# Patient Record
(demographics unavailable — no encounter records)

---

## 2025-02-24 NOTE — HISTORY OF PRESENT ILLNESS
[de-identified] : Patient is a 12-year-old male accompanied with mother here for evaluation of right little finger injury.  Patient states earlier today he was playing basketball patient states that he tripped and fell and landed on his right hand with immediate pain of the right little finger.  Patient was brought to urgent care by mother x-rays were taken and patient was told he had a little finger fracture.  Patient was placed in splint and told to follow-up with orthopedics.  Right little finger exam: Noted swelling of proximal phalanx, PIP, middle phalanx with surrounding ecchymosis, no injury to nailbed no deformity.  Tenderness palpation to proximal phalanx, middle phalanx and PIPJ, good range of motion with stiffness to PIPJ, no gross instability with varus/valgus stress no gross instability neurovascular intact good capillary refill  X-ray right little finger: Displaced dorsal avulsion fracture off of the base of the middle phalanx  X-ray right little finger postreduction: Improved alignment of dorsal avulsion fracture of the base of the middle phalanx  Discussed with patient and mother detail that patient has a displaced middle phalanx base fracture.  Discussed in detail that sometimes these fractures do require surgical intervention but can be treated conservatively.  Plan today is for reduction and splinting.  Using sterile method with patient and mother's permission 3 cc 1% lidocaine used for digital block fifth digit.  Fracture was reduced and patient was sent up for repeat x-rays after posterior finger splint.  X-rays show mild improved alignment.  Patient is to follow-up in 1 week with hand department/pediatric orthopedics for repeat x-rays with splint on of right little finger.  Do not get splint wet, use cast cover no gym/sports no high-impact activities.

## 2025-03-12 NOTE — ASSESSMENT
[FreeTextEntry1] : Placed in ulnar brace today to be worn at all times follow up in 3 weeks for new x-rays

## 2025-03-12 NOTE — PHYSICAL EXAM
[Not Examined] : not examined [Normal] : The patient is moving all extremities spontaneously without any gross neurologic deficits. They walk with a fluid nonantalgic gait. There are equal and symmetric deep tendon reflexes in the upper and lower extremities bilaterally. There is gross intact sensation to soft and light touch in the bilateral upper and lower extremities [de-identified] :  ISLT Intact Motor

## 2025-03-12 NOTE — PHYSICAL EXAM
[Not Examined] : not examined [Normal] : The patient is moving all extremities spontaneously without any gross neurologic deficits. They walk with a fluid nonantalgic gait. There are equal and symmetric deep tendon reflexes in the upper and lower extremities bilaterally. There is gross intact sensation to soft and light touch in the bilateral upper and lower extremities [de-identified] :  ISLT Intact Motor

## 2025-03-12 NOTE — HISTORY OF PRESENT ILLNESS
[FreeTextEntry1] : 13 y/o male here with closed displaced fracture of middle phalanx of the right fifth finger. Fracture was reduced by KONG Monet on 2/24/25 and was placed in a splint.

## 2025-04-01 NOTE — DATA REVIEWED
[Acceptable Fracture Allignment] : fracture alignment remains acceptable [de-identified] :  2 views of right fingers reviewed.

## 2025-04-01 NOTE — DATA REVIEWED
[Acceptable Fracture Allignment] : fracture alignment remains acceptable [de-identified] :  2 views of right fingers reviewed.

## 2025-04-01 NOTE — ASSESSMENT
[FreeTextEntry1] : Can discontinue wearing the ulnar brace. No sports/gym for one week. Follow up in 4 months for physis check.

## 2025-04-01 NOTE — PHYSICAL EXAM
[Not Examined] : not examined [Normal] : The patient is moving all extremities spontaneously without any gross neurologic deficits. They walk with a fluid nonantalgic gait. There are equal and symmetric deep tendon reflexes in the upper and lower extremities bilaterally. There is gross intact sensation to soft and light touch in the bilateral upper and lower extremities [de-identified] :  ISLT Intact Motor full extension

## 2025-04-01 NOTE — PHYSICAL EXAM
[Not Examined] : not examined [Normal] : The patient is moving all extremities spontaneously without any gross neurologic deficits. They walk with a fluid nonantalgic gait. There are equal and symmetric deep tendon reflexes in the upper and lower extremities bilaterally. There is gross intact sensation to soft and light touch in the bilateral upper and lower extremities [de-identified] :  ISLT Intact Motor full extension

## 2025-04-01 NOTE — HISTORY OF PRESENT ILLNESS
[FreeTextEntry1] : 11 y/o male here with fracture of middle phalanx of right fifth finger. Placed in ulnar brace at last visit, 1 month out from fracture.

## 2025-07-30 NOTE — DATA REVIEWED
[Acceptable Fracture Allignment] : fracture alignment remains acceptable [de-identified] :  2 views of right fingers reviewed.

## 2025-07-30 NOTE — PHYSICAL EXAM
[Not Examined] : not examined [Normal] : The patient is moving all extremities spontaneously without any gross neurologic deficits. They walk with a fluid nonantalgic gait. There are equal and symmetric deep tendon reflexes in the upper and lower extremities bilaterally. There is gross intact sensation to soft and light touch in the bilateral upper and lower extremities [de-identified] : full ROM

## 2025-07-30 NOTE — HISTORY OF PRESENT ILLNESS
[FreeTextEntry1] : 11 y/o male here with fracture of middle phalanx of right fifth finger. 2 months out from fracture, has been wearing an ulnar brace.

## 2025-07-30 NOTE — DATA REVIEWED
[Acceptable Fracture Allignment] : fracture alignment remains acceptable [de-identified] :  2 views of right fingers reviewed.

## 2025-07-30 NOTE — PHYSICAL EXAM
[Not Examined] : not examined [Normal] : The patient is moving all extremities spontaneously without any gross neurologic deficits. They walk with a fluid nonantalgic gait. There are equal and symmetric deep tendon reflexes in the upper and lower extremities bilaterally. There is gross intact sensation to soft and light touch in the bilateral upper and lower extremities [de-identified] : full ROM